# Patient Record
(demographics unavailable — no encounter records)

---

## 2024-11-25 NOTE — HISTORY OF PRESENT ILLNESS
[de-identified] : sick [FreeTextEntry6] : Mother states child had asthma attacks over the weekend, febrile TMAX 102- fever control with Tylenol and Motrin  coughing  Mother did albuterol inhaler

## 2024-11-25 NOTE — HISTORY OF PRESENT ILLNESS
[de-identified] : sick [FreeTextEntry6] : Mother states child had asthma attacks over the weekend, febrile TMAX 102- fever control with Tylenol and Motrin  coughing  Mother did albuterol inhaler

## 2025-07-15 NOTE — HISTORY OF PRESENT ILLNESS
[FreeTextEntry1] : FOLLOW UP 7/15/25: no albuterol in a long time  has seasonal allergies has cat and dog allergy (tested) takes allergy med as needed  no triggers in the bubba e +eczema flovent 44, 2 puffs BID all year now down to once a day since beg on fjune  will go back to BID in sept  no snoring  mom's mom and sisters have asthma  no activity induced symptoms bball  swimming  recess  no night cough when not sick         This is a 6 year old male here for evaluation of recurrent URIs X 1 year. Moderate persistent asthma -improved on Fluticasone during the fall/winter He had 5 courses of steroids in 2 years. Last illness-  he was noted to have low oxygen saturations(91%) at PMD's office. Received albuterol X3, received prednisolone X 5 days. Budesonide q day was started. Also received azithromycin X 5 days  2024 visit. Last seen 2024. Interval Hx: Doing well overall. Had a good winter - not getting sick frequently. But this summer whenever he would go to a friend's house with a dog - he would cough, have itchy eyes and complain of tightness in his throat - mother gave rescue meds and Claritin. Patient has been at summer camp - swims and plays basketball without any SOB. Needs Albuterol for school. Recent ER visits/hospitalizations: none Last oral steroid course:none Cough, SOB, or wheeze/ nighttime awakening with cough/wheeze: cough with exposure to dogs Daily meds: Fluticasone Propionate 2 puffs BID (off for summer), Albuterol PRN Last used rescue: a few weeks ago when he visited a friend with a dog Allergic rhinitis symptoms:sneezing, runny nose, swollen eyes with exposure to dog Flu vaccine:yes  2024 visit. Last seen 2023. Interval Hx: Doing well. Recent ER visits/hospitalizations: denies Last oral steroid course: 2022 Cough, SOB, or wheeze/ nighttime awakening with cough/wheeze: denies Daily meds: Fluticasone Propionate 44 2 puffs BID, Claritin PRN, Albuterol 2-4 puffs Q4 PRN Last used rescue: 5 weeks ago for SOB in the middle of the night x 1 Allergic rhinitis symptoms: denies Flu vaccine: yes COVID 19 vaccine: yes with booster COVID exposure: denies  2023 visit. Last seen 2023. Interval Hx: Doing well. Recent ER visits/hospitalizations: denies Last oral steroid course: denies Cough, SOB, or wheeze/ nighttime awakening with cough/wheeze: denies Daily meds: Flovent 44 2 puffs BID, Claritin or Zyrtec PRN, Albuterol PRN Last used rescue: before August Allergic rhinitis symptoms: denies  Flu vaccine: yes COVID 19 vaccine: yes with booster this year COVID exposure: denies  2023 visit. Last seen 2023. Interval Hx: Doing well. When off Flovent for summer wheezing and dry cough started coming back. Smoke alerts did not help. Started Flovent 44 mcg 2 puffs once daily in AM. Needs PA for Flovent. Not covering since July. Recent ER visits/hospitalizations: denies Last oral steroid course: denies Cough, SOB, or wheeze/ nighttime awakening with cough/wheeze: denies Daily meds: Flovent 44 2 puffs BID September to May, now on Flovent in AM, Zyrtec PRN, Albuterol PRN- uses spacer with mask. Brushes teeth and washes face after Flovent use. Last used rescue: 2022 when he had flu. Allergic rhinitis symptoms: denies, but has seasonal allergies Flu vaccine: yes COVID 19 vaccine: yes COVID exposure: denies, last had COVID in 2022.   visit; Last seen  Interval Hx: Mother states child has been doing a lot better since starting Flovent. Patient has had wet cough the past few days, runny nose, sneezing- mother thinks its related to allergies. Using albuterol 1-2x per day for the past few days. Recent ER visits/hospitalizations: denies Last oral steroid course: denies Cough, SOB, or wheeze/ nighttime awakening with cough/wheeze: Occasional dry cough daily. Daily meds: Flovent 44 2 puffs BID, Albuterol PRN Last used rescue: yesterday Allergic rhinitis symptoms: runny nose, cough, sneezing that just started this week. Has not gave any allergy medicines. Flu vaccine: - COVID 19 vaccine: yes.  2022 visit. Last seen 2022 Interval history: doing better on Flovent. Had URI in  and then flu in November - had some coughing but no bronchitis ER/hospitalizations since last visit - none oral steroids since last visit - none cough/wheeze, SOB, night time awakening with cough/wheeze - denied allergy symptoms - some nasal congestion last used rescue - November with flu  Meds: Flovent 44 2 puffs BID COVID -19 exposure- none COVID vaccine- two doses of Pfizer flu vaccine - yes  Age of onset of respiratory Sx: 3yrs DX with asthma/RAD: no Hospitalization/year: Denies ED visits/year: 8 visits to PMD's office Steroid courses/year: 5 Last oral steroid course: -8/15 Typical Sx: cough, wheeze, shortness of breath, retractions Typical trigger: URI/viral exercise, allergic trigger, weather change, cold weather Time of year: all-year Response to albuterol: typically yes Response to oral steroids: good Using spacer: No , uses nebulizer Interval Sx: exercise intolerance: denies nocturnal cough: denies daily cough: denies Atopic Sx: + eczema, seasonal allergies environmental allergies food allergies GI Sx: no reflux Sx ENT Sx: chronic congestion snoring Family history: + asthma- several maternal family members with asthma. Father takes antihistamines for sinus issues and allergies per mom Current Sx: Controlled on budesonide per mom Denies reflux, snoring, choking episodes Denies prior allergy testing but mom reports watering of eyes on exposure to grass and dogs  Meds: budesonide 2.5mg qDaily COVID exposure COVID vaccine X 2-  flu vaccine  Born FT, , in [place] No respiratory complications at birth SH: parents, older sister, MGM, No pets/smokers Modified asthma predictive index: parental history of asthma + physician diagnosed atopic dermatitis denies environmental allergies+ peripheral eosinophilia food allergies - denies    Recent Exacerbation History: 0 visits to the emergency room since the last visit and hospitalized 0 times since the last visit.   Current Asthma Symptoms: no cough and no wheezing.   Short Acting Bronchodilator Use: > than or = 2 days/wk   Nocturnal Awakenin x/month   Interference with Normal Activity: None   Asthma Symptoms: > than or = 2 days/week   Excerbation requiring Oral Corticosteroids: > than or = 2/year   ACT Questionnaire Group: 16 -